# Patient Record
Sex: MALE | URBAN - NONMETROPOLITAN AREA
[De-identification: names, ages, dates, MRNs, and addresses within clinical notes are randomized per-mention and may not be internally consistent; named-entity substitution may affect disease eponyms.]

---

## 2017-02-28 ENCOUNTER — APPOINTMENT (RX ONLY)
Dept: URBAN - NONMETROPOLITAN AREA CLINIC 22 | Facility: CLINIC | Age: 47
Setting detail: DERMATOLOGY
End: 2017-02-28

## 2017-02-28 VITALS — WEIGHT: 268 LBS | HEIGHT: 74 IN

## 2017-02-28 PROBLEM — D48.5 NEOPLASM OF UNCERTAIN BEHAVIOR OF SKIN: Status: ACTIVE | Noted: 2017-02-28

## 2017-02-28 PROCEDURE — 11100: CPT

## 2017-02-28 PROCEDURE — ? BIOPSY BY SHAVE METHOD

## 2017-03-16 ENCOUNTER — APPOINTMENT (RX ONLY)
Dept: URBAN - NONMETROPOLITAN AREA CLINIC 22 | Facility: CLINIC | Age: 47
Setting detail: DERMATOLOGY
End: 2017-03-16

## 2017-03-16 PROBLEM — F41.9 ANXIETY DISORDER, UNSPECIFIED: Status: ACTIVE | Noted: 2017-03-16

## 2017-03-16 PROBLEM — C44.319 BASAL CELL CARCINOMA OF SKIN OF OTHER PARTS OF FACE: Status: ACTIVE | Noted: 2017-03-16

## 2017-03-16 PROBLEM — Z85.828 PERSONAL HISTORY OF OTHER MALIGNANT NEOPLASM OF SKIN: Status: ACTIVE | Noted: 2017-03-16

## 2017-03-16 PROCEDURE — 17312 MOHS ADDL STAGE: CPT

## 2017-03-16 PROCEDURE — 17311 MOHS 1 STAGE H/N/HF/G: CPT

## 2017-03-16 PROCEDURE — ? MOHS SURGERY

## 2017-04-10 ENCOUNTER — APPOINTMENT (RX ONLY)
Dept: URBAN - NONMETROPOLITAN AREA CLINIC 22 | Facility: CLINIC | Age: 47
Setting detail: DERMATOLOGY
End: 2017-04-10

## 2017-04-10 DIAGNOSIS — Z48.817 ENCOUNTER FOR SURGICAL AFTERCARE FOLLOWING SURGERY ON THE SKIN AND SUBCUTANEOUS TISSUE: ICD-10-CM

## 2017-04-10 PROCEDURE — ? POST-OP WOUND CHECK

## 2017-04-10 ASSESSMENT — LOCATION SIMPLE DESCRIPTION DERM: LOCATION SIMPLE: RIGHT FOREHEAD

## 2017-04-10 ASSESSMENT — LOCATION ZONE DERM: LOCATION ZONE: FACE

## 2017-04-10 ASSESSMENT — LOCATION DETAILED DESCRIPTION DERM: LOCATION DETAILED: RIGHT LATERAL FOREHEAD

## 2017-04-10 NOTE — PROCEDURE: POST-OP WOUND CHECK
Wound Evaluated By: DAHIANA
Detail Level: Detailed
Add 01900 Cpt? (Important Note: In 2017 The Use Of 78582 Is Being Tracked By Cms To Determine Future Global Period Reimbursement For Global Periods): no

## 2017-05-11 NOTE — PROCEDURE: MOHS SURGERY
After Visit Summary      Facility     Name Address Phone Fax    [facility] [address]  [place] [address] [phone]           Patient Discharge Summary   5/11/2017    Lani Babcock            Please bring this medication reconciliation form to your next doctor’s appointment(s). Please update the form if you stop taking any of these medications or you start taking any new medications including over the counter medications. Also please carry a copy of this form with you at all times in the event of an emergency.    A copy of these discharge instructions was reviewed with and given to the patient/patient representative/Guardian/Caregiver at discharge.          Allergies as of 5/11/2017        Reactions    Norco [Hydrocodone-acetaminophen] Nausea & Vomiting    Codeine Nausea & Vomiting    Flexeril [Cyclobenzaprine Hcl] RASH           Discharge Medications           Your Medications       Start Taking     No Medications Reported      Continue These Medications Which Have Not Changed     ALPRAZOLAM (XANAX) 0.5 MG TABLET TAKE TWO TABLETS BY MOUTH THREE TIMES A DAY AS NEEDED    Notes:   --          ALPRAZOLAM (XANAX) 1 MG TABLET Take 1 tablet by mouth 3 times daily as needed for Sleep.    Notes:   Change from 0.5 2/tid to 1 mg tid          ALUM & MAG HYDROXIDE-SIMETH (MAALOX MAX) 400-400-40 MG/5ML SUSPENSION Take 20 mLs by mouth 4 times daily (before meals and nightly).    Notes:   --          AMPHETAMINE-DEXTROAMPHETAMINE (ADDERALL) 20 MG TABLET Take 1 tablet by mouth 3 times daily.    Notes:   --          AUGMENTED BETAMETHASONE DIPROPIONATE (DIPROLENE) 0.05 % OINTMENT Apply to affected area bid prn    Notes:   --          BUDESONIDE-FORMOTEROL (SYMBICORT) 160-4.5 MCG/ACT INHALER Inhale 2 puffs into the lungs 2 times daily.    Notes:   PLEASE NOTIFY PATIENT THAT THIS IS A NEW MEDICATION AS ADVAIR WAS NOT APPROVED   PLEASE  CONTACT PATIENT WHEN MEDICATION IS AVAILABLE    PLEASE INSTRUCT PATIENT IN HOW TO USE THE MEDICATION          BUPROPION (WELLBUTRIN SR) 150 MG 12 HR TABLET TAKE TWO TABLETS BY MOUTH TWICE DAILY    Notes:   --          CHOLECALCIFEROL (VITAMIN D3) 93146 UNITS CAP TAKE ONE CAPSULE BY MOUTH WEEKLY    Notes:   --          CYCLOSPORINE (RESTASIS) 0.05 % OPHTHALMIC EMULSION Place 1 drop into both eyes 2 times daily.    Notes:   --          DICYCLOMINE (BENTYL) 20 MG TABLET TAKE 1 TABLET BY MOUTH 3 TIMES DAILY AS NEEDED FOR SPASMS    Notes:   --          DIPHENOXYLATE-ATROPINE (LOMOTIL) 2.5-0.025 MG TABLET TAKE ONE TABLET BY MOUTH THREE TIMES DAILY AS NEEDED    Notes:   --          FERROUS SULFATE 325 (65 FE) MG EC TABLET Take 1 tablet by mouth daily.    Notes:   --          FOLIC ACID (FOLATE) 1 MG TABLET Take 1 tablet by mouth daily.    Notes:   --          METOCLOPRAMIDE (REGLAN) 10 MG TABLET Take 1 tablet by mouth 4 times daily (before meals and nightly).    Notes:   --          MONTELUKAST (SINGULAIR) 10 MG TABLET Take 1 tablet by mouth nightly.    Notes:   --          MULTIPLE VITAMINS-MINERALS (MULTIVITAMIN PO) Take 1 tablet by mouth daily.     Notes:   --          ONDANSETRON (ZOFRAN ODT) 8 MG DISINTEGRATING TABLET DISSOLVE ONE TABLET IN MOUTH EVERY EIGHT HOURS AS NEEDED FOR NAUSEA    Notes:   --          ONDANSETRON (ZOFRAN) 8 MG TABLET TAKE ONE TABLET BY MOUTH THREE TIMES DAILY AS NEEDED FOR NAUSEA    Notes:   --          ONDANSETRON (ZOFRAN) 8 MG TABLET TAKE ONE TABLET BY MOUTH THREE TIMES DAILY AS NEEDED FOR NAUSEA    Notes:   --          PANTOPRAZOLE (PROTONIX) 40 MG TABLET TAKE ONE TABLET BY MOUTH DAILY    Notes:   --          PREDNISONE (DELTASONE) 20 MG TABLET 3 tabs po qdaily for 3 days , 2 tabs for 2 days, 1 tab for 2 days    Notes:   --          PROAIR  (90 BASE) MCG/ACT INHALER INHALE 2 PUFFS INTO THE LUNGS EVERY 4 HOURS AS NEEDED FOR SHORTNESS OF BREATH OR WHEEZING.    Notes:   --           SODIUM CHLORIDE FLUSH (NORMAL SALINE FLUSH IV) Inject 10 mLs into the vein Daily. USe 10ml to flush lumen per [facility] protocol    Notes:   --          SODIUM CHLORIDE FLUSH (NORMAL SALINE FLUSH) 0.9 % SOLUTION Inject 1,000 mLs into the vein twice a week. tuesdays and fridays    Notes:   --          SUMATRIPTAN (IMITREX) 50 MG TABLET TAKE 1 TABLET BY MOUTH AT ONSET OF HEADACHE, MAY REPEAT IN 2 HOURS AS NEEDED    Notes:   --          SUMATRIPTAN (IMITREX) 50 MG TABLET TAKE 1 TABLET BY MOUTH AT ONSET OF MIGRAINE. MAY REPEAT AFTER 2 HOURS IF NEEDED.    Notes:   --          TOPIRAMATE (TOPAMAX) 50 MG TABLET Take TWO Nightly for and One additional daily    Notes:   --          TRIAMCINOLONE (ARISTOCORT) 0.1 % CREAM Apply bid prn    Notes:   --          VITAMIN B-12 (CYANOCOBALAMIN) 1000 MCG TABLET Take 1 tablet by mouth daily.    Notes:   --            These Medications Have Changed     No Medications Reported      Stop taking these medications, discuss with your pharmacist     No Medications Reported      Facility Administered Medications     No Medications Reported      Follow-up Instructions     Return in 3 months (on 8/3/2017) for pump refill.      Your To Do List     5/13/2017 9:00 AM     Appointment with [facility] at [facility] Surgical Scheduling ([phone])       5/15/2017 10:00 AM     Appointment with Pamela Martinez MD; [facility] OPHTH TECH 1 at [facility] Ophthalmology ([phone])       8/3/2017 12:30 PM     Appointment with Konrad Smyth MD at [facility] ([phone])       8/8/2017 11:00 AM     [facility]ointment with [facility] at [facility] ([phone])       8/31/2017 2:00 PM     Appointment with Peña Noel MD at [facility] Pulmonary Medicine ([phone])         Discharge Instructions     None      Discharge References/Attachments     None       Your Opinion Matters To Us  If you receive a patient satisfaction  survey in the mail, please complete and return it in the postage-paid envelope.    We truly value and appreciate your feedback.           Lani Babcock        Your Current Medications Are        Details    sumatriptan (IMITREX) 50 MG tablet TAKE 1 TABLET BY MOUTH AT ONSET OF MIGRAINE. MAY REPEAT AFTER 2 HOURS IF NEEDED.    dicyclomine (BENTYL) 20 MG tablet TAKE 1 TABLET BY MOUTH 3 TIMES DAILY AS NEEDED FOR SPASMS    Cholecalciferol (VITAMIN D3) 70964 units Cap TAKE ONE CAPSULE BY MOUTH WEEKLY    montelukast (SINGULAIR) 10 MG tablet Take 1 tablet by mouth nightly.    amphetamine-dextroamphetamine (ADDERALL) 20 MG tablet Take 1 tablet by mouth 3 times daily.    ondansetron (ZOFRAN) 8 MG tablet TAKE ONE TABLET BY MOUTH THREE TIMES DAILY AS NEEDED FOR NAUSEA    pantoprazole (PROTONIX) 40 MG tablet TAKE ONE TABLET BY MOUTH DAILY    ALPRAZolam (XANAX) 0.5 MG tablet TAKE TWO TABLETS BY MOUTH THREE TIMES A DAY AS NEEDED    PROAIR  (90 Base) MCG/ACT inhaler INHALE 2 PUFFS INTO THE LUNGS EVERY 4 HOURS AS NEEDED FOR SHORTNESS OF BREATH OR WHEEZING.    diphenoxylate-atropine (LOMOTIL) 2.5-0.025 MG tablet TAKE ONE TABLET BY MOUTH THREE TIMES DAILY AS NEEDED    predniSONE (DELTASONE) 20 MG tablet 3 tabs po qdaily for 3 days , 2 tabs for 2 days, 1 tab for 2 days    budesonide-formoterol (SYMBICORT) 160-4.5 MCG/ACT inhaler Inhale 2 puffs into the lungs 2 times daily.    ALPRAZolam (XANAX) 1 MG tablet Take 1 tablet by mouth 3 times daily as needed for Sleep.    folic acid (FOLATE) 1 MG tablet Take 1 tablet by mouth daily.    ondansetron (ZOFRAN) 8 MG tablet TAKE ONE TABLET BY MOUTH THREE TIMES DAILY AS NEEDED FOR NAUSEA    metoCLOPramide (REGLAN) 10 MG tablet Take 1 tablet by mouth 4 times daily (before meals and nightly).    buPROPion (WELLBUTRIN SR) 150 MG 12 hr tablet TAKE TWO TABLETS BY MOUTH TWICE DAILY    sumatriptan (IMITREX) 50 MG tablet TAKE 1 TABLET BY MOUTH AT ONSET OF HEADACHE, MAY REPEAT IN 2 HOURS AS NEEDED     triamcinolone (ARISTOCORT) 0.1 % cream Apply bid prn    ondansetron (ZOFRAN ODT) 8 MG disintegrating tablet DISSOLVE ONE TABLET IN MOUTH EVERY EIGHT HOURS AS NEEDED FOR NAUSEA    Sodium Chloride Flush (NORMAL SALINE FLUSH IV) Inject 10 mLs into the vein Daily. USe 10ml to flush lumen per [facility] protocol    Sodium Chloride Flush (NORMAL SALINE FLUSH) 0.9 % Solution Inject 1,000 mLs into the vein twice a week. tuesdays and fridays    ferrous sulfate 325 (65 FE) MG EC tablet Take 1 tablet by mouth daily.    topiramate (TOPAMAX) 50 MG tablet Take TWO Nightly for and One additional daily    augmented betamethasone dipropionate (DIPROLENE) 0.05 % ointment Apply to affected area bid prn    vitamin B-12 (CYANOCOBALAMIN) 1000 MCG tablet Take 1 tablet by mouth daily.    Alum & Mag Hydroxide-Simeth (MAALOX MAX) 400-400-40 MG/5ML Suspension Take 20 mLs by mouth 4 times daily (before meals and nightly).    cycloSPORINE (RESTASIS) 0.05 % ophthalmic emulsion Place 1 drop into both eyes 2 times daily.    Multiple Vitamins-Minerals (MULTIVITAMIN PO) Take 1 tablet by mouth daily.        Cartilage Graft Text: The defect edges were debeveled with a #15 scalpel blade.  Given the location of the defect, shape of the defect, the fact the defect involved a full thickness cartilage defect a cartilage graft was deemed most appropriate.  An appropriate donor site was identified, cleansed, and anesthetized. The cartilage graft was then harvested and transferred to the recipient site, oriented appropriately and then sutured into place.  The secondary defect was then repaired using a primary closure.

## 2017-10-11 ENCOUNTER — APPOINTMENT (RX ONLY)
Dept: URBAN - NONMETROPOLITAN AREA CLINIC 22 | Facility: CLINIC | Age: 47
Setting detail: DERMATOLOGY
End: 2017-10-11

## 2017-10-11 VITALS — HEIGHT: 74 IN | WEIGHT: 280 LBS

## 2017-10-11 DIAGNOSIS — D485 NEOPLASM OF UNCERTAIN BEHAVIOR OF SKIN: ICD-10-CM

## 2017-10-11 DIAGNOSIS — B07.8 OTHER VIRAL WARTS: ICD-10-CM

## 2017-10-11 DIAGNOSIS — L57.8 OTHER SKIN CHANGES DUE TO CHRONIC EXPOSURE TO NONIONIZING RADIATION: ICD-10-CM

## 2017-10-11 DIAGNOSIS — D18.0 HEMANGIOMA: ICD-10-CM

## 2017-10-11 DIAGNOSIS — Z85.828 PERSONAL HISTORY OF OTHER MALIGNANT NEOPLASM OF SKIN: ICD-10-CM

## 2017-10-11 DIAGNOSIS — D22 MELANOCYTIC NEVI: ICD-10-CM

## 2017-10-11 PROBLEM — D22.5 MELANOCYTIC NEVI OF TRUNK: Status: ACTIVE | Noted: 2017-10-11

## 2017-10-11 PROBLEM — I10 ESSENTIAL (PRIMARY) HYPERTENSION: Status: ACTIVE | Noted: 2017-10-11

## 2017-10-11 PROBLEM — L57.0 ACTINIC KERATOSIS: Status: ACTIVE | Noted: 2017-10-11

## 2017-10-11 PROBLEM — F32.9 MAJOR DEPRESSIVE DISORDER, SINGLE EPISODE, UNSPECIFIED: Status: ACTIVE | Noted: 2017-10-11

## 2017-10-11 PROBLEM — D18.01 HEMANGIOMA OF SKIN AND SUBCUTANEOUS TISSUE: Status: ACTIVE | Noted: 2017-10-11

## 2017-10-11 PROBLEM — E78.5 HYPERLIPIDEMIA, UNSPECIFIED: Status: ACTIVE | Noted: 2017-10-11

## 2017-10-11 PROBLEM — D48.5 NEOPLASM OF UNCERTAIN BEHAVIOR OF SKIN: Status: ACTIVE | Noted: 2017-10-11

## 2017-10-11 PROCEDURE — ? BIOPSY BY SHAVE METHOD

## 2017-10-11 PROCEDURE — ? LIQUID NITROGEN

## 2017-10-11 PROCEDURE — 11100: CPT | Mod: 59

## 2017-10-11 PROCEDURE — ? COUNSELING

## 2017-10-11 PROCEDURE — 99213 OFFICE O/P EST LOW 20 MIN: CPT | Mod: 25

## 2017-10-11 PROCEDURE — 17110 DESTRUCTION B9 LES UP TO 14: CPT

## 2017-10-11 ASSESSMENT — LOCATION DETAILED DESCRIPTION DERM
LOCATION DETAILED: LEFT PROXIMAL RADIAL DORSAL FOREARM
LOCATION DETAILED: RIGHT MEDIAL INFERIOR CHEST
LOCATION DETAILED: LEFT LATERAL UPPER BACK
LOCATION DETAILED: RIGHT BUTTOCK
LOCATION DETAILED: RIGHT SUPERIOR LATERAL FOREHEAD
LOCATION DETAILED: LEFT MID DORSAL INDEX FINGER
LOCATION DETAILED: RIGHT PROXIMAL DORSAL INDEX FINGER
LOCATION DETAILED: RIGHT FOREHEAD
LOCATION DETAILED: LEFT DISTAL RADIAL THUMB
LOCATION DETAILED: NASAL ROOT

## 2017-10-11 ASSESSMENT — LOCATION SIMPLE DESCRIPTION DERM
LOCATION SIMPLE: RIGHT FOREHEAD
LOCATION SIMPLE: LEFT INDEX FINGER
LOCATION SIMPLE: NOSE
LOCATION SIMPLE: RIGHT INDEX FINGER
LOCATION SIMPLE: LEFT FOREARM
LOCATION SIMPLE: LEFT UPPER BACK
LOCATION SIMPLE: RIGHT BUTTOCK
LOCATION SIMPLE: LEFT THUMB
LOCATION SIMPLE: CHEST

## 2017-10-11 ASSESSMENT — LOCATION ZONE DERM
LOCATION ZONE: FACE
LOCATION ZONE: NOSE
LOCATION ZONE: FINGER
LOCATION ZONE: TRUNK
LOCATION ZONE: ARM

## 2017-10-11 NOTE — PROCEDURE: BIOPSY BY SHAVE METHOD
Anesthesia Type: 1% lidocaine with epinephrine
Biopsy Type: H and E
Destruction After The Procedure: No
Silver Nitrate Text: The wound bed was treated with silver nitrate after the biopsy was performed.
Curettage Text: The wound bed was treated with curettage after the biopsy was performed.
X Size Of Lesion In Cm: 0
Biopsy Method: Dermablade
Detail Level: Detailed
Hemostasis: Arnav's
Electrodesiccation And Curettage Text: The wound bed was treated with electrodesiccation and curettage after the biopsy was performed.
Post-Care Instructions: I reviewed with the patient in detail post-care instructions. Patient is to keep the biopsy site dry overnight, and then apply bacitracin twice daily until healed. Patient may apply hydrogen peroxide soaks to remove any crusting.
Consent: Written consent was obtained and risks were reviewed including but not limited to scarring, infection, bleeding, scabbing, incomplete removal, nerve damage and allergy to anesthesia.
Billing Type: Third-Party Bill
Lab Facility: [id number]
Notification Instructions: Patient will be notified of biopsy results. However, patient instructed to call the office if not contacted within 2 weeks.
Anesthesia Volume In Cc (Will Not Render If 0): 0.5
Lab: [id number]
Type Of Destruction Used: Curettage
Wound Care: Polysporin ointment
Electrodesiccation Text: The wound bed was treated with electrodesiccation after the biopsy was performed.
Dressing: bandage
Cryotherapy Text: The wound bed was treated with cryotherapy after the biopsy was performed.

## 2017-10-11 NOTE — PROCEDURE: LIQUID NITROGEN
Medical Necessity Information: It is in your best interest to select a reason for this procedure from the list below. All of these items fulfill various CMS LCD requirements except the new and changing color options.
Post-Care Instructions: I reviewed with the patient in detail post-care instructions. Patient is to wear sunprotection, and avoid picking at any of the treated lesions. Pt may apply Vaseline to crusted or scabbing areas.
Number Of Freeze-Thaw Cycles: 1 freeze-thaw cycle
Include Z78.9 (Other Specified Conditions Influencing Health Status) As An Associated Diagnosis?: No
Detail Level: Detailed
Medical Necessity Clause: This procedure was medically necessary because the lesions that were treated were:
Duration Of Freeze Thaw-Cycle (Seconds): 10-15
Consent: The patient's consent was obtained including but not limited to risks of crusting, scabbing, blistering, scarring, darker or lighter pigmentary change, recurrence, incomplete removal and infection.